# Patient Record
Sex: MALE | Race: WHITE
[De-identification: names, ages, dates, MRNs, and addresses within clinical notes are randomized per-mention and may not be internally consistent; named-entity substitution may affect disease eponyms.]

---

## 2020-11-23 ENCOUNTER — TRANSCRIPTION ENCOUNTER (OUTPATIENT)
Age: 25
End: 2020-11-23

## 2021-02-08 ENCOUNTER — EMERGENCY (EMERGENCY)
Facility: HOSPITAL | Age: 26
LOS: 0 days | Discharge: HOME | End: 2021-02-08
Attending: HOSPITALIST
Payer: COMMERCIAL

## 2021-02-08 VITALS
RESPIRATION RATE: 17 BRPM | OXYGEN SATURATION: 100 % | SYSTOLIC BLOOD PRESSURE: 148 MMHG | TEMPERATURE: 98 F | HEART RATE: 90 BPM | DIASTOLIC BLOOD PRESSURE: 92 MMHG

## 2021-02-08 VITALS — WEIGHT: 199.96 LBS | HEIGHT: 71 IN
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
## 2021-02-08 DIAGNOSIS — S01.111A LACERATION WITHOUT FOREIGN BODY OF RIGHT EYELID AND PERIOCULAR AREA, INITIAL ENCOUNTER: ICD-10-CM
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
## 2021-02-08 DIAGNOSIS — S01.112A LACERATION WITHOUT FOREIGN BODY OF LEFT EYELID AND PERIOCULAR AREA, INITIAL ENCOUNTER: ICD-10-CM
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
## 2021-02-08 PROCEDURE — 13152 CMPLX RPR E/N/E/L 2.6-7.5 CM: CPT | Mod: RT

## 2021-02-08 PROCEDURE — 99285 EMERGENCY DEPT VISIT HI MDM: CPT

## 2021-02-08 NOTE — ED STATDOCS - PROGRESS NOTE DETAILS
24 yo male presents with R orbital laceration s/p skiing in his friend's backyard and hit his face and his ski pole. Last tdap was a few years ago. Plastics came in, states it's her neighbor's son who asked her to come repair the laceration for her son. -Rolan Gomes PA-C Dr. Sarmiento came to suture wound. Will see in the office next week. -Rolan Gomes PA-C

## 2021-02-08 NOTE — ED STATDOCS - CARE PROVIDER_API CALL
Mary Lou Parra)  Plastic Surgery  [address] [facility], [address]  [place] [address]  Phone: [phone]  Fax: [phone]  Follow Up Time:

## 2021-02-08 NOTE — ED ADULT NURSE NOTE - OBJECTIVE STATEMENT
pt c/o laceration to right eyebrow. pt in no acute distress s/p suture by md hobson. pt verbalized understanding of dc instructions. Aox4, ambulated out of ED.

## 2021-02-08 NOTE — ED STATDOCS - EYES, MLM
clear bilaterally.  Pupils equal, round, and reactive to light. +1cm laceration to upper eyelid, normal H test, no eye pain

## 2021-02-08 NOTE — ED STATDOCS - NS_ ATTENDINGSCRIBEDETAILS _ED_A_ED_FT
I personally saw the patient with the PA, and completed the key components of the history and physical exam. I then discussed the management plan with the PA.    Arleen Huang MD: The history, relevant review of systems, past medical and surgical history, medical decision making, and physical examination was documented by the scribe in my presence and I attest to the accuracy of the documentation.

## 2021-02-08 NOTE — ED STATDOCS - CLINICAL SUMMARY MEDICAL DECISION MAKING FREE TEXT BOX
24 y/o with small lac to eyelid after hitting himself in face with ski pole, will cleanse and close. Tetanus UTD.

## 2021-02-08 NOTE — ED STATDOCS - PATIENT PORTAL LINK FT
You can access the FollowMyHealth Patient Portal offered by [facility] by registering at the following website: [email or web address]. By joining [facility]’s FollowMyHealth portal, you will also be able to view your health information using other applications (apps) compatible with our system.

## 2021-02-08 NOTE — ED STATDOCS - NSFOLLOWUPINSTRUCTIONS_ED_ALL_ED_FT
Laceration    WHAT YOU NEED TO KNOW:    A laceration is an injury to the skin and the soft tissue underneath it. Lacerations happen when you are cut or hit by something. They can happen anywhere on the body.     DISCHARGE INSTRUCTIONS:    Return to the emergency department if:     You have heavy bleeding or bleeding that does not stop after 10 minutes of holding firm, direct pressure over the wound.       Your wound opens up.     Contact your healthcare provider if:     You have a fever or chills.       Your laceration is red, warm, or swollen.      You have red streaks on your skin coming from your wound.      You have white or yellow drainage from the wound that smells bad.      You have pain that gets worse, even after treatment.       You have questions or concerns about your condition or care.     Medicines:     Take your medicine as directed. Contact your healthcare provider if you think your medicine is not helping or if you have side effects. Tell him or her if you are allergic to any medicine. Keep a list of the medicines, vitamins, and herbs you take. Include the amounts, and when and why you take them. Bring the list or the pill bottles to follow-up visits. Carry your medicine list with you in case of an emergency.    Care for your wound as directed:     Do not get your wound wet until your healthcare provider says it is okay. Do not soak your wound in water. Do not go swimming until your healthcare provider says it is okay. Carefully wash the wound with soap and water. Gently pat the area dry or allow it to air dry.       Change your bandages when they get wet, dirty, or after washing. Apply new, clean bandages as directed. Do not apply elastic bandages or tape too tight. Do not put powders or lotions over your incision.       Apply antibiotic ointment as directed. Your healthcare provider may give you antibiotic ointment to put over your wound if you have stitches. If you have strips of tape over your incision, let them dry up and fall off on their own. If they do not fall off within 14 days, gently remove them. If you have glue over your wound, do not remove or pick at it. If your glue comes off, do not replace it with glue that you have at home.       Check your wound every day for signs of infection such as swelling, redness, or pus.     Self-care:     Apply ice on your wound for 15 to 20 minutes every hour or as directed. Use an ice pack, or put crushed ice in a plastic bag. Cover it with a towel. Ice helps prevent tissue damage and decreases swelling and pain.      Decrease scarring of your wound by applying ointments as directed. Do not apply ointments until your healthcare provider says it is okay. You may need to wait until your wound is healed. Ask which ointment to buy and how often to use it. After your wound is healed, use sunscreen over the area when you are out in the sun. You should do this for at least 6 months to 1 year after your injury.     Follow up with your healthcare provider as directed: You may need to follow up in 24 to 48 hours to have your wound checked for infection. You will need to return in 3 to 14 days if you have stitches or staples so they can be removed. Care for your wound as directed to prevent infection and help it heal. Write down your questions so you remember to ask them during your visits.

## 2021-02-08 NOTE — ED ADULT TRIAGE NOTE - CHIEF COMPLAINT QUOTE
Pt presents to ER c/o eye lid laceration. Pt reports he was skiing in a friends backyard and ski pole jammed into right eye. Approximately 1cm laceration on eye lid. Denies change in vision. Bleeding controlled. Pt contacted plastics PTA

## 2021-02-08 NOTE — ED STATDOCS - OBJECTIVE STATEMENT
26 y/o male with no pertinent PMHx presents to the ED c/o eye lid laceration. Pt went skiing in his neighbor's backyard, fell forward, hitting himself with the back of his ski pole. Contact fell out at time. Denies eye pain, but noticed small laceration to upper eyelid. Tetanus UTD.

## 2021-08-11 ENCOUNTER — TRANSCRIPTION ENCOUNTER (OUTPATIENT)
Age: 26
End: 2021-08-11

## 2023-07-31 ENCOUNTER — OFFICE (OUTPATIENT)
Dept: URBAN - METROPOLITAN AREA CLINIC 92 | Facility: CLINIC | Age: 28
Setting detail: OPHTHALMOLOGY
End: 2023-07-31
Payer: COMMERCIAL

## 2023-07-31 DIAGNOSIS — H16.223: ICD-10-CM

## 2023-07-31 DIAGNOSIS — H40.013: ICD-10-CM

## 2023-07-31 PROCEDURE — 92083 EXTENDED VISUAL FIELD XM: CPT | Performed by: SPECIALIST

## 2023-07-31 PROCEDURE — 92133 CPTRZD OPH DX IMG PST SGM ON: CPT | Performed by: SPECIALIST

## 2023-07-31 PROCEDURE — 99213 OFFICE O/P EST LOW 20 MIN: CPT | Performed by: SPECIALIST

## 2023-07-31 PROCEDURE — 92020 GONIOSCOPY: CPT | Performed by: SPECIALIST

## 2023-07-31 ASSESSMENT — DECREASING TEAR LAKE - SEVERITY SCORE
OD_DEC_TEARLAKE: 1+
OS_DEC_TEARLAKE: 1+

## 2023-07-31 ASSESSMENT — PACHYMETRY
OS_CT_UM: 512
OD_CT_CORRECTION: 2
OS_CT_CORRECTION: 2
OD_CT_UM: 512

## 2023-07-31 ASSESSMENT — REFRACTION_MANIFEST
OD_SPHERE: -2.50
OD_CYLINDER: SPHERE
OD_CYLINDER: SPH
OS_VA1: 20/20
OS_SPHERE: -2.50
OS_CYLINDER: SPHERE
OD_VA1: 20/20
OS_SPHERE: -1.75
OS_CYLINDER: SPH
OD_SPHERE: -1.75

## 2023-07-31 ASSESSMENT — TONOMETRY
OD_IOP_MMHG: 14
OS_IOP_MMHG: 15

## 2023-07-31 ASSESSMENT — REFRACTION_AUTOREFRACTION
OD_CYLINDER: +0.50
OS_AXIS: 164
OS_CYLINDER: +0.25
OD_SPHERE: -3.00
OS_SPHERE: -2.75
OD_AXIS: 068

## 2023-07-31 ASSESSMENT — SPHEQUIV_DERIVED
OS_SPHEQUIV: -2.625
OD_SPHEQUIV: -2.75

## 2023-07-31 ASSESSMENT — VISUAL ACUITY
OD_BCVA: 20/25-2
OS_BCVA: 20/30-2

## 2023-07-31 ASSESSMENT — REFRACTION_CURRENTRX
OD_SPHERE: -1.75
OS_SPHERE: -1.75
OS_CYLINDER: SPHERE
OD_VPRISM_DIRECTION: SV
OS_OVR_VA: 20/
OD_CYLINDER: SPHERE
OS_VPRISM_DIRECTION: SV
OD_OVR_VA: 20/